# Patient Record
Sex: MALE | Race: WHITE | NOT HISPANIC OR LATINO | Employment: FULL TIME | URBAN - NONMETROPOLITAN AREA
[De-identification: names, ages, dates, MRNs, and addresses within clinical notes are randomized per-mention and may not be internally consistent; named-entity substitution may affect disease eponyms.]

---

## 2020-02-19 ENCOUNTER — TRANSCRIBE ORDERS (OUTPATIENT)
Dept: ADMINISTRATIVE | Facility: HOSPITAL | Age: 40
End: 2020-02-19

## 2020-02-19 ENCOUNTER — HOSPITAL ENCOUNTER (OUTPATIENT)
Dept: ULTRASOUND IMAGING | Facility: HOSPITAL | Age: 40
Discharge: HOME | End: 2020-02-19
Admitting: NURSE PRACTITIONER

## 2020-02-19 ENCOUNTER — HOSPITAL ENCOUNTER (OUTPATIENT)
Dept: ULTRASOUND IMAGING | Facility: HOSPITAL | Age: 40
Discharge: HOME | End: 2020-02-19

## 2020-02-19 DIAGNOSIS — R31.9 HEMATURIA, UNSPECIFIED TYPE: Primary | ICD-10-CM

## 2020-02-19 DIAGNOSIS — R31.9 HEMATURIA, UNSPECIFIED TYPE: ICD-10-CM

## 2020-02-19 PROCEDURE — 76700 US EXAM ABDOM COMPLETE: CPT | Performed by: RADIOLOGY

## 2020-02-19 PROCEDURE — 76857 US EXAM PELVIC LIMITED: CPT | Performed by: RADIOLOGY

## 2020-02-19 PROCEDURE — 76857 US EXAM PELVIC LIMITED: CPT

## 2020-02-19 PROCEDURE — 76700 US EXAM ABDOM COMPLETE: CPT

## 2020-02-21 ENCOUNTER — HOSPITAL ENCOUNTER (EMERGENCY)
Facility: HOSPITAL | Age: 40
Discharge: HOME | End: 2020-02-21
Attending: EMERGENCY MEDICINE | Admitting: EMERGENCY MEDICINE

## 2020-02-21 ENCOUNTER — OFFICE VISIT (OUTPATIENT)
Dept: UROLOGY | Facility: CLINIC | Age: 40
End: 2020-02-21

## 2020-02-21 ENCOUNTER — APPOINTMENT (OUTPATIENT)
Dept: GENERAL RADIOLOGY | Facility: HOSPITAL | Age: 40
End: 2020-02-21

## 2020-02-21 VITALS
TEMPERATURE: 97.6 F | DIASTOLIC BLOOD PRESSURE: 74 MMHG | SYSTOLIC BLOOD PRESSURE: 115 MMHG | RESPIRATION RATE: 18 BRPM | BODY MASS INDEX: 34.86 KG/M2 | OXYGEN SATURATION: 97 % | HEIGHT: 68 IN | WEIGHT: 230 LBS | HEART RATE: 75 BPM

## 2020-02-21 VITALS
BODY MASS INDEX: 34.44 KG/M2 | DIASTOLIC BLOOD PRESSURE: 70 MMHG | HEIGHT: 71 IN | WEIGHT: 246 LBS | SYSTOLIC BLOOD PRESSURE: 118 MMHG

## 2020-02-21 DIAGNOSIS — N20.1 LEFT URETERAL STONE: Primary | ICD-10-CM

## 2020-02-21 LAB
ALBUMIN SERPL-MCNC: 4.05 G/DL (ref 3.5–5.2)
ALBUMIN/GLOB SERPL: 1.5 G/DL
ALP SERPL-CCNC: 101 U/L (ref 39–117)
ALT SERPL W P-5'-P-CCNC: 16 U/L (ref 1–41)
AMYLASE SERPL-CCNC: 37 U/L (ref 28–100)
ANION GAP SERPL CALCULATED.3IONS-SCNC: 13.8 MMOL/L (ref 5–15)
AST SERPL-CCNC: 20 U/L (ref 1–40)
BASOPHILS # BLD AUTO: 0.05 10*3/MM3 (ref 0–0.2)
BASOPHILS NFR BLD AUTO: 0.4 % (ref 0–1.5)
BILIRUB SERPL-MCNC: 0.7 MG/DL (ref 0.2–1.2)
BILIRUB UR QL STRIP: NEGATIVE
BUN BLD-MCNC: 14 MG/DL (ref 6–20)
BUN/CREAT SERPL: 13.3 (ref 7–25)
CALCIUM SPEC-SCNC: 8.8 MG/DL (ref 8.6–10.5)
CHLORIDE SERPL-SCNC: 102 MMOL/L (ref 98–107)
CLARITY UR: CLEAR
CO2 SERPL-SCNC: 21.2 MMOL/L (ref 22–29)
COLOR UR: YELLOW
CREAT BLD-MCNC: 1.05 MG/DL (ref 0.76–1.27)
CRP SERPL-MCNC: 2.64 MG/DL (ref 0–0.5)
DEPRECATED RDW RBC AUTO: 39.8 FL (ref 37–54)
EOSINOPHIL # BLD AUTO: 0.06 10*3/MM3 (ref 0–0.4)
EOSINOPHIL NFR BLD AUTO: 0.4 % (ref 0.3–6.2)
ERYTHROCYTE [DISTWIDTH] IN BLOOD BY AUTOMATED COUNT: 13.7 % (ref 12.3–15.4)
GFR SERPL CREATININE-BSD FRML MDRD: 78 ML/MIN/1.73
GLOBULIN UR ELPH-MCNC: 2.8 GM/DL
GLUCOSE BLD-MCNC: 104 MG/DL (ref 65–99)
GLUCOSE UR STRIP-MCNC: NEGATIVE MG/DL
HCT VFR BLD AUTO: 43.5 % (ref 37.5–51)
HGB BLD-MCNC: 13.8 G/DL (ref 13–17.7)
HGB UR QL STRIP.AUTO: NEGATIVE
HOLD SPECIMEN: NORMAL
HOLD SPECIMEN: NORMAL
IMM GRANULOCYTES # BLD AUTO: 0.05 10*3/MM3 (ref 0–0.05)
IMM GRANULOCYTES NFR BLD AUTO: 0.4 % (ref 0–0.5)
KETONES UR QL STRIP: NEGATIVE
LEUKOCYTE ESTERASE UR QL STRIP.AUTO: NEGATIVE
LIPASE SERPL-CCNC: 23 U/L (ref 13–60)
LYMPHOCYTES # BLD AUTO: 2.53 10*3/MM3 (ref 0.7–3.1)
LYMPHOCYTES NFR BLD AUTO: 18 % (ref 19.6–45.3)
MAGNESIUM SERPL-MCNC: 2.1 MG/DL (ref 1.6–2.6)
MCH RBC QN AUTO: 25.1 PG (ref 26.6–33)
MCHC RBC AUTO-ENTMCNC: 31.7 G/DL (ref 31.5–35.7)
MCV RBC AUTO: 79.2 FL (ref 79–97)
MONOCYTES # BLD AUTO: 1.15 10*3/MM3 (ref 0.1–0.9)
MONOCYTES NFR BLD AUTO: 8.2 % (ref 5–12)
NEUTROPHILS # BLD AUTO: 10.22 10*3/MM3 (ref 1.7–7)
NEUTROPHILS NFR BLD AUTO: 72.6 % (ref 42.7–76)
NITRITE UR QL STRIP: NEGATIVE
NRBC BLD AUTO-RTO: 0 /100 WBC (ref 0–0.2)
PH UR STRIP.AUTO: >=9 [PH] (ref 5–8)
PLATELET # BLD AUTO: 311 10*3/MM3 (ref 140–450)
PMV BLD AUTO: 9.2 FL (ref 6–12)
POTASSIUM BLD-SCNC: 4 MMOL/L (ref 3.5–5.2)
PROT SERPL-MCNC: 6.8 G/DL (ref 6–8.5)
PROT UR QL STRIP: NEGATIVE
RBC # BLD AUTO: 5.49 10*6/MM3 (ref 4.14–5.8)
SODIUM BLD-SCNC: 137 MMOL/L (ref 136–145)
SP GR UR STRIP: 1.02 (ref 1–1.03)
UROBILINOGEN UR QL STRIP: ABNORMAL
WBC NRBC COR # BLD: 14.06 10*3/MM3 (ref 3.4–10.8)
WHOLE BLOOD HOLD SPECIMEN: NORMAL
WHOLE BLOOD HOLD SPECIMEN: NORMAL

## 2020-02-21 PROCEDURE — 80053 COMPREHEN METABOLIC PANEL: CPT | Performed by: PHYSICIAN ASSISTANT

## 2020-02-21 PROCEDURE — 85025 COMPLETE CBC W/AUTO DIFF WBC: CPT | Performed by: PHYSICIAN ASSISTANT

## 2020-02-21 PROCEDURE — 74018 RADEX ABDOMEN 1 VIEW: CPT | Performed by: RADIOLOGY

## 2020-02-21 PROCEDURE — 99284 EMERGENCY DEPT VISIT MOD MDM: CPT

## 2020-02-21 PROCEDURE — 83690 ASSAY OF LIPASE: CPT | Performed by: PHYSICIAN ASSISTANT

## 2020-02-21 PROCEDURE — 25010000002 HYDROMORPHONE 1 MG/ML SOLUTION: Performed by: EMERGENCY MEDICINE

## 2020-02-21 PROCEDURE — 99204 OFFICE O/P NEW MOD 45 MIN: CPT | Performed by: NURSE PRACTITIONER

## 2020-02-21 PROCEDURE — 83735 ASSAY OF MAGNESIUM: CPT | Performed by: PHYSICIAN ASSISTANT

## 2020-02-21 PROCEDURE — 82150 ASSAY OF AMYLASE: CPT | Performed by: PHYSICIAN ASSISTANT

## 2020-02-21 PROCEDURE — 81003 URINALYSIS AUTO W/O SCOPE: CPT | Performed by: PHYSICIAN ASSISTANT

## 2020-02-21 PROCEDURE — 96375 TX/PRO/DX INJ NEW DRUG ADDON: CPT

## 2020-02-21 PROCEDURE — 96374 THER/PROPH/DIAG INJ IV PUSH: CPT

## 2020-02-21 PROCEDURE — 86140 C-REACTIVE PROTEIN: CPT | Performed by: PHYSICIAN ASSISTANT

## 2020-02-21 PROCEDURE — 74018 RADEX ABDOMEN 1 VIEW: CPT

## 2020-02-21 PROCEDURE — 25010000002 KETOROLAC TROMETHAMINE PER 15 MG: Performed by: PHYSICIAN ASSISTANT

## 2020-02-21 RX ORDER — SODIUM CHLORIDE 0.9 % (FLUSH) 0.9 %
10 SYRINGE (ML) INJECTION AS NEEDED
Status: DISCONTINUED | OUTPATIENT
Start: 2020-02-21 | End: 2020-02-21 | Stop reason: HOSPADM

## 2020-02-21 RX ORDER — KETOROLAC TROMETHAMINE 10 MG/1
10 TABLET, FILM COATED ORAL EVERY 6 HOURS PRN
Qty: 16 TABLET | Refills: 0 | Status: SHIPPED | OUTPATIENT
Start: 2020-02-21 | End: 2020-02-25

## 2020-02-21 RX ORDER — KETOROLAC TROMETHAMINE 30 MG/ML
30 INJECTION, SOLUTION INTRAMUSCULAR; INTRAVENOUS ONCE
Status: COMPLETED | OUTPATIENT
Start: 2020-02-21 | End: 2020-02-21

## 2020-02-21 RX ADMIN — HYDROMORPHONE HYDROCHLORIDE 1 MG: 1 INJECTION, SOLUTION INTRAMUSCULAR; INTRAVENOUS; SUBCUTANEOUS at 08:16

## 2020-02-21 RX ADMIN — KETOROLAC TROMETHAMINE 30 MG: 30 INJECTION, SOLUTION INTRAMUSCULAR; INTRAVENOUS at 08:50

## 2020-02-21 RX ADMIN — SODIUM CHLORIDE 1000 ML: 9 INJECTION, SOLUTION INTRAVENOUS at 08:15

## 2020-02-21 NOTE — ED NOTES
Patient states that he had a CT scan done yesterday and it showed he had a kidney stone. Patient states he has a history of kidney stones. Patient states that he has pain medication at home but that his pain became severe this morning. Patient complains of left sided flank pain with bladder pressure. Patient had Zofran ODT prior to arrival. Patient states that his pain radiates down into his left inguinal area.       Cas Rivera RN  02/21/20 [id number]

## 2020-02-21 NOTE — PROGRESS NOTES
Chief Complaint:          Chief Complaint   Patient presents with   • Nephrolithiasis     hospital f/u     Left Ureteral Stone  HPI:   39 y.o. male.    Very Pleasant Patient presents to clinic today with wife.  He reports he has been in the ED all morning secondary to kidney stone pain. Patient reports his discomfort has been ongoing x 3 days.    He states it started with colicky left flank pain that was intermittent, but this morning it became sharp 10/10, constant, with radiation to his left groin area. His pain was aggravated by movement and any slight position changes. It was unrelieved with Norco 5 and Zofran. He had nausea, vomiting that caused him to come to the ED.    Patient went to [facility] ED yesterday for similar symptoms and had a CT scan done showing a Proximal left ureteral 6 mm stone. He had a KUB done prior to this office visit that shows the stone in SITU.    He is in no apparent distress upon exam. He still has left flank pain, pelvic pain/discomfort that is dull in nature. He denies urinary symptoms of frequency, urgency and dysuria. He does not have gross hematuria, denies chills/fevers, N/V/D.    He has a history of kidney stone (four) in the past which he all passed. He is relatively healthy with no significant medical issues.  He would like to try to pass this even though I discussed the fact that he has a 10 to 20% chance.      Past Medical History:        Past Medical History:   Diagnosis Date   • Kidney stones      The following portions of the patient's history were reviewed and updated as appropriate: allergies, current medications, past family history, past medical history, past social history, past surgical history and problem list.    Current Meds:     Current Outpatient Medications   Medication Sig Dispense Refill   • HYDROcodone-acetaminophen (NORCO)  MG per tablet Take 1 tablet by mouth Every 6 (Six) Hours As Needed for Moderate Pain . 10 tablet 0   • ketorolac  (TORADOL) 10 MG tablet Take 1 tablet by mouth Every 6 (Six) Hours As Needed for Moderate Pain . Do not take with any other NSAID PLZ.  May alternate with tylenol 16 tablet 0     No current facility-administered medications for this visit.         Allergies:      Allergies   Allergen Reactions   • Penicillins Hives        Past Surgical History:     No past surgical history on file.      Social History:     Social History     Socioeconomic History   • Marital status:      Spouse name: Not on file   • Number of children: Not on file   • Years of education: Not on file   • Highest education level: Not on file   Tobacco Use   • Smoking status: Never Smoker   • Smokeless tobacco: Never Used   Substance and Sexual Activity   • Alcohol use: Not Currently     Frequency: Never   • Drug use: Never   • Sexual activity: Yes     Partners: Female       Family History:     Family History   Problem Relation Age of Onset   • No Known Problems Father    • No Known Problems Mother        Review of Systems:    Review of Systems   Constitutional: Negative for activity change, appetite change, chills, fatigue and fever.   HENT: Positive for sinus pressure. Negative for congestion.    Eyes: Negative for blurred vision and double vision.   Respiratory: Negative for shortness of breath and wheezing.    Cardiovascular: Negative for chest pain.   Gastrointestinal: Positive for constipation, nausea and vomiting. Negative for abdominal pain and diarrhea.   Genitourinary: Positive for flank pain. Negative for difficulty urinating, discharge, dysuria, frequency, genital sores, hematuria, penile pain, penile swelling, scrotal swelling, testicular pain, urgency and urinary incontinence.   Musculoskeletal: Positive for back pain. Negative for neck pain.   Skin: Negative for color change, dry skin, pallor, rash and skin lesions.   Neurological: Negative for dizziness, weakness, headache and confusion.   Hematological: Does not bruise/bleed  easily.   Psychiatric/Behavioral: Positive for suicidal ideas. Negative for agitation, behavioral problems, decreased concentration, self-injury, sleep disturbance and stress. The patient is not nervous/anxious.         Physical Exam:     Physical Exam   Constitutional: He is oriented to person, place, and time. He appears well-developed and well-nourished. No distress.   HENT:   Head: Normocephalic and atraumatic.   Right Ear: External ear normal.   Left Ear: External ear normal.   Eyes: Pupils are equal, round, and reactive to light. Conjunctivae and EOM are normal. Right eye exhibits no discharge. Left eye exhibits no discharge.   Neck: Normal range of motion. Neck supple. No tracheal deviation present. No thyromegaly present.   Cardiovascular: Normal rate and regular rhythm. Exam reveals no friction rub.   No murmur heard.  Pulmonary/Chest: Effort normal and breath sounds normal. No stridor. No respiratory distress.   Abdominal: Soft. Bowel sounds are normal. He exhibits no distension. There is tenderness. There is no guarding.   Genitourinary: Rectum normal, testes normal and penis normal. Rectal exam shows guaiac negative stool. Uncircumcised. No penile tenderness. No discharge found.   Genitourinary Comments: Pelvic pain/pressure     Musculoskeletal: Normal range of motion. He exhibits tenderness. He exhibits no edema or deformity.   Left Flank pain   Neurological: He is alert and oriented to person, place, and time. No cranial nerve deficit or sensory deficit. He exhibits normal muscle tone. Coordination normal.   Skin: Skin is warm and dry. Capillary refill takes less than 2 seconds. No pallor.   Psychiatric: He has a normal mood and affect. His behavior is normal. Judgment and thought content normal.       Procedure:     No notes on file      Assessment:     Encounter Diagnosis   Name Primary?   • Left ureteral stone Yes     Orders Placed This Encounter   Procedures   • XR Abdomen KUB     Standing Status:    Future     Number of Occurrences:   1     Standing Expiration Date:   2/21/2021     Order Specific Question:   Reason for Exam:     Answer:   left Ureteral stone       Plan:    Nephrolithiasis/left ureteral calculus: Patient presents to clinic with left flank pain that has been ongoing for 2 days.  He has a history of kidney stones, his last one was 15 years ago.    He went to the ED and had a CT scan done which we both reviewed and discussed, showing an approximate 6 mm proximal left ureteral stone with mild hydronephrosis. The right kidney has a 2 cm cyst which we also discussed, otherwise normal.    The Patient has been diagnosed with a ureteral calculus. We have discussed the various parameters regarding spontaneous passage including the notion that a 2-3 mm stone has a high likelihood of spontaneous passage versus a larger stone of 6mm like he has  being caught up in the upper areas of the urinary tract.      We also discussed the medical management of stone disease and the use of medical expulsive therapy in the form of Flomax. This is used in an off label setting. I also talked about nonoperative management including ambulation and increasing fluids and hot tub as being an effective adjuncts in the treatment of a ureteral stone.     We discussed the various therapeutic options available including ESWL, Percutaneous Nephrostolithotomy, Lithotripsy.  We discussed the risks of lithotripsy including the passage of stones the development of a large string of stones in the distal ureter known as Steinstrasse.  In the 3% incidence of that we will need to proceed with a ureteroscopy for obstructing fragments. We also discussed an extremely rare incidence of renal hematoma, and the significance of this.       ESWL-the patient is a candidate for extracorporeal shockwave lithotripsy.  We discussed the type of stone.  And the complications associated with the procedure including but not limited to pain in the flank,  hematoma, spontaneous renal hemorrhage, and adequate fragmentation of stones.  The need for passage of the stones.  The need for concomitant additional procedures in the range of 24% the risk of a distal fragment in the range of 3% requiring ureteroscopic      We discussed the absolute relative indicators for intervention including the presence of sepsis, and pain we cannot control as the primary need for urgent intervention.  We discussed placement of a stent if indicated and the management of the stent as well.     I Discussed this case with Dr Ibrahim, Patient would like to give the stone some time, he thinks he can possibly pass it like he did with his last stone 15 years ago.  He wants to try this weekend.    Encourage significant p.o. fluid intake, gave him a refill of pain medication just in case and Nausea medication and Flomax for medical expulsive therapy.     We will see him back on Monday for follow-up with another KUB, at which point we will discuss surgical options again.  Patient and wife agreeable with plan of care        Patient's Body mass index is 34.31 kg/m². BMI is above normal parameters. Recommendations include: educational material, exercise counseling and nutrition counseling.      Smoking Cessation Counseling:  Never a smoker.  Patient does not currently use any tobacco products.       Counseling was given to patient and family for the following topics diagnostic results including: Proximal left ureteral calculus, instructions for management as follows: Increasing p.o. fluid intake 2 to 3 L daily.  Exercise, walking, pain, nausea control, and prognosis and treatment as follows: Discuss surgical options, ESWL. The interim medical history and current results were reviewed.  A treatment plan with follow-up was made for Left ureteral stone [N20.1].          This document has been electronically signed by Griselda Cheng-Akwa, APRN February 24, 2020 5:38 PM

## 2020-02-21 NOTE — ED PROVIDER NOTES
Subjective   39-year-old male presents to the ED today for left flank pain.  He states this is been going on for the last 3 days and now the pain is worse and more constant.  He does have a past history of kidney stones.  He has never required surgery for them in the past.  He had a CT scan yesterday at [facility] which showed a proximal left ureteral stone measuring approximately 5 or 6 mm.  He states he took a Norco 5 mg around 3 AM and then Zofran prior to arrival.  He states this did not help with his symptoms.  He denies any blood in his urine or difficulty urinating.  He denies any fever.  He states he has had nausea but no vomiting.  He does have an appointment in the urology office today at 2 PM but states due to his pain he was unable to wait.      History provided by:  Patient  Flank Pain   Pain location:  L flank  Pain quality: sharp    Pain radiates to:  Does not radiate  Pain severity:  Severe  Onset quality:  Sudden  Duration:  72 hours  Timing:  Constant  Progression:  Worsening  Chronicity:  Recurrent  Context: not recent travel, not sick contacts, not suspicious food intake and not trauma    Relieved by:  Nothing  Worsened by:  Nothing  Associated symptoms: nausea    Associated symptoms: no anorexia, no chest pain, no chills, no constipation, no cough, no diarrhea, no dysuria, no fatigue, no fever, no hematemesis, no hematochezia, no hematuria, no melena, no shortness of breath and no vomiting        Review of Systems   Constitutional: Negative for chills, fatigue and fever.   HENT: Negative.    Eyes: Negative.    Respiratory: Negative for cough and shortness of breath.    Cardiovascular: Negative for chest pain.   Gastrointestinal: Positive for nausea. Negative for anorexia, constipation, diarrhea, hematemesis, hematochezia, melena and vomiting.   Genitourinary: Positive for flank pain. Negative for difficulty urinating, dysuria and hematuria.   Skin: Negative.     Neurological: Negative.    Psychiatric/Behavioral: Negative.    All other systems reviewed and are negative.      Past Medical History:   Diagnosis Date   • Kidney stones        Allergies   Allergen Reactions   • Penicillins Hives       History reviewed. No pertinent surgical history.    History reviewed. No pertinent family history.    Social History     Socioeconomic History   • Marital status:      Spouse name: Not on file   • Number of children: Not on file   • Years of education: Not on file   • Highest education level: Not on file   Tobacco Use   • Smoking status: Never Smoker   Substance and Sexual Activity   • Drug use: Never   • Sexual activity: Yes     Partners: Female           Objective   Physical Exam   Constitutional: He is oriented to person, place, and time. He appears well-developed and well-nourished. He appears distressed (appears to be in pain).   HENT:   Head: Normocephalic and atraumatic.   Right Ear: External ear normal.   Left Ear: External ear normal.   Nose: Nose normal.   Mouth/Throat: Oropharynx is clear and moist.   Eyes: Pupils are equal, round, and reactive to light. Conjunctivae and EOM are normal.   Neck: Normal range of motion. Neck supple.   Cardiovascular: Normal rate, regular rhythm, normal heart sounds and intact distal pulses.   Pulmonary/Chest: Effort normal and breath sounds normal.   Abdominal: Soft. Bowel sounds are normal. There is tenderness (along the left flank and left CVA area). There is no rebound and no guarding.   Musculoskeletal: Normal range of motion.   Neurological: He is alert and oriented to person, place, and time.   Skin: Skin is warm and dry. Capillary refill takes less than 2 seconds.   Psychiatric: He has a normal mood and affect. His behavior is normal. Judgment and thought content normal.   Nursing note and vitals reviewed.      Procedures           ED Course  ED Course as of Feb 21 0942   Fri Feb 21, 2020   0827 CT of the abdomen and pelvis  without contrast from yesterday reviewed.  Patient had hydroureteronephrosis on the left secondary to a 6 mm proximal left ureteral calculus.    [AH]   0904 Patient reports his pain is much improved.    [AH]   0907 FINDINGS: One view of the abdomen demonstrates large calcification  superior to the left L4 transverse process most likely representing a  large left ureteral stone measuring approximately 5.6 mm.     IMPRESSION:  Appearance compatible with large left ureteral stone    XR Abdomen KUB [AH]   0936 Patient is currently sleeping, no distress.  I have contacted the urology office and they can fit him in at this time to be seen now.  Patient took Flomax last night before bed and already has a prescription for pain, nausea meds at home.  He will go directly to urology office and will return to the ED as needed.    [AH]      ED Course User Index  [AH] Maryam Torres PA                                           [facility]  Number of Diagnoses or Management Options  Left ureteral stone:      Amount and/or Complexity of Data Reviewed  Clinical lab tests: reviewed  Tests in the radiology section of CPT®: reviewed    Patient Progress  Patient progress: improved      Final diagnoses:   Left ureteral stone            Maryam Torres PA  02/21/20 2296

## 2020-02-21 NOTE — ED NOTES
Patient states that he is still in pain at this time and would like to have something else to help it. Provider VIOLET rader made aware.      Cas Rivera RN  02/21/20 0859

## 2020-02-24 ENCOUNTER — OFFICE VISIT (OUTPATIENT)
Dept: UROLOGY | Facility: CLINIC | Age: 40
End: 2020-02-24

## 2020-02-24 ENCOUNTER — HOSPITAL ENCOUNTER (OUTPATIENT)
Dept: GENERAL RADIOLOGY | Facility: HOSPITAL | Age: 40
Discharge: HOME | End: 2020-02-24
Admitting: NURSE PRACTITIONER

## 2020-02-24 VITALS — BODY MASS INDEX: 34.44 KG/M2 | HEIGHT: 71 IN | WEIGHT: 246 LBS

## 2020-02-24 DIAGNOSIS — N20.1 LEFT URETERAL STONE: Primary | ICD-10-CM

## 2020-02-24 DIAGNOSIS — N20.1 LEFT URETERAL STONE: ICD-10-CM

## 2020-02-24 PROBLEM — N20.0 RENAL CALCULUS: Status: ACTIVE | Noted: 2020-02-24

## 2020-02-24 PROCEDURE — 74019 RADEX ABDOMEN 2 VIEWS: CPT | Performed by: RADIOLOGY

## 2020-02-24 PROCEDURE — 99214 OFFICE O/P EST MOD 30 MIN: CPT | Performed by: NURSE PRACTITIONER

## 2020-02-24 PROCEDURE — 74018 RADEX ABDOMEN 1 VIEW: CPT

## 2020-02-24 RX ORDER — HYDROCODONE BITARTRATE AND ACETAMINOPHEN 10; 325 MG/1; MG/1
1 TABLET ORAL EVERY 6 HOURS PRN
Qty: 10 TABLET | Refills: 0 | Status: SHIPPED | OUTPATIENT
Start: 2020-02-24 | End: 2020-02-25

## 2020-02-24 RX ORDER — GENTAMICIN SULFATE 80 MG/100ML
80 INJECTION, SOLUTION INTRAVENOUS ONCE
Status: CANCELLED | OUTPATIENT
Start: 2020-02-24 | End: 2020-02-24

## 2020-02-24 NOTE — PATIENT INSTRUCTIONS
"Dietary Guidelines to Help Prevent Kidney Stones  Kidney stones are deposits of minerals and salts that form inside your kidneys. Your risk of developing kidney stones may be greater depending on your diet, your lifestyle, the medicines you take, and whether you have certain medical conditions. Most people can reduce their chances of developing kidney stones by following the instructions below. Depending on your overall health and the type of kidney stones you tend to develop, your dietitian may give you more specific instructions.  What are tips for following this plan?  Reading food labels  · Choose foods with \"no salt added\" or \"low-salt\" labels. Limit your sodium intake to less than 1500 mg per day.  · Choose foods with calcium for each meal and snack. Try to eat about 300 mg of calcium at each meal. Foods that contain 200-500 mg of calcium per serving include:  ? 8 oz (237 ml) of milk, fortified nondairy milk, and fortified fruit juice.  ? 8 oz (237 ml) of kefir, yogurt, and soy yogurt.  ? 4 oz (118 ml) of tofu.  ? 1 oz of cheese.  ? 1 cup (300 g) of dried figs.  ? 1 cup (91 g) of cooked broccoli.  ? 1-3 oz can of sardines or mackerel.  · Most people need 1000 to 1500 mg of calcium each day. Talk to your dietitian about how much calcium is recommended for you.  Shopping  · Buy plenty of fresh fruits and vegetables. Most people do not need to avoid fruits and vegetables, even if they contain nutrients that may contribute to kidney stones.  · When shopping for convenience foods, choose:  ? Whole pieces of fruit.  ? Premade salads with dressing on the side.  ? Low-fat fruit and yogurt smoothies.  · Avoid buying frozen meals or prepared deli foods.  · Look for foods with live cultures, such as yogurt and kefir.  Cooking  · Do not add salt to food when cooking. Place a salt shaker on the table and allow each person to add his or her own salt to taste.  · Use vegetable protein, such as beans, textured vegetable " protein (TVP), or tofu instead of meat in pasta, casseroles, and soups.  Meal planning    · Eat less salt, if told by your dietitian. To do this:  ? Avoid eating processed or premade food.  ? Avoid eating fast food.  · Eat less animal protein, including cheese, meat, poultry, or fish, if told by your dietitian. To do this:  ? Limit the number of times you have meat, poultry, fish, or cheese each week. Eat a diet free of meat at least 2 days a week.  ? Eat only one serving each day of meat, poultry, fish, or seafood.  ? When you prepare animal protein, cut pieces into small portion sizes. For most meat and fish, one serving is about the size of one deck of cards.  · Eat at least 5 servings of fresh fruits and vegetables each day. To do this:  ? Keep fruits and vegetables on hand for snacks.  ? Eat 1 piece of fruit or a handful of berries with breakfast.  ? Have a salad and fruit at lunch.  ? Have two kinds of vegetables at dinner.  · Limit foods that are high in a substance called oxalate. These include:  ? Spinach.  ? Rhubarb.  ? Beets.  ? Potato chips and french fries.  ? Nuts.  · If you regularly take a diuretic medicine, make sure to eat at least 1-2 fruits or vegetables high in potassium each day. These include:  ? Avocado.  ? Banana.  ? Orange, prune, carrot, or tomato juice.  ? Baked potato.  ? Cabbage.  ? Beans and split peas.  General instructions    · Drink enough fluid to keep your urine clear or pale yellow. This is the most important thing you can do.  · Talk to your health care provider and dietitian about taking daily supplements. Depending on your health and the cause of your kidney stones, you may be advised:  ? Not to take supplements with vitamin C.  ? To take a calcium supplement.  ? To take a daily probiotic supplement.  ? To take other supplements such as magnesium, fish oil, or vitamin B6.  · Take all medicines and supplements as told by your health care provider.  · Limit alcohol intake to no  more than 1 drink a day for nonpregnant women and 2 drinks a day for men. One drink equals 12 oz of beer, 5 oz of wine, or 1½ oz of hard liquor.  · Lose weight if told by your health care provider. Work with your dietitian to find strategies and an eating plan that works best for you.  What foods are not recommended?  Limit your intake of the following foods, or as told by your dietitian. Talk to your dietitian about specific foods you should avoid based on the type of kidney stones and your overall health.  Grains  Breads. Bagels. Rolls. Baked goods. Salted crackers. Cereal. Pasta.  Vegetables  Spinach. Rhubarb. Beets. Canned vegetables. Pickles. Olives.  Meats and other protein foods  Nuts. Nut butters. Large portions of meat, poultry, or fish. Salted or cured meats. Deli meats. Hot dogs. Sausages.  Dairy  Cheese.  Beverages  Regular soft drinks. Regular vegetable juice.  Seasonings and other foods  Seasoning blends with salt. Salad dressings. Canned soups. Soy sauce. Ketchup. Barbecue sauce. Canned pasta sauce. Casseroles. Pizza. Lasagna. Frozen meals. Potato chips. French fries.  Summary  · You can reduce your risk of kidney stones by making changes to your diet.  · The most important thing you can do is drink enough fluid. You should drink enough fluid to keep your urine clear or pale yellow.  · Ask your health care provider or dietitian how much protein from animal sources you should eat each day, and also how much salt and calcium you should have each day.  This information is not intended to replace advice given to you by your health care provider. Make sure you discuss any questions you have with your health care provider.  Document Released: 04/13/2012 Document Revised: 11/28/2017 Document Reviewed: 11/28/2017  [employer] Patient Education © 2020 [employer]

## 2020-02-24 NOTE — PROGRESS NOTES
Chief Complaint:          Chief Complaint   Patient presents with   • Nephrolithiasis     f/u KUB prior       HPI:   39 y.o. male.  Patient returns to clinic today for follow-up.  He had a repeat KUB done today that show large calcification just superior  to the left L3 transverse process, most likely representing left-sided 6 mm  ureteral stone.  He is desirous of surgical intervention.  He states he is unable to take the pain/discomfort anymore.    He describes ongoing left flank pain that has been very colicky, occasionally sharp causing him significant nausea and episodes of vomiting.  Has increasing pelvic pain and pressure, with urinary symptoms that are very bothersome to him.    He denies dysuria, gross hematuria, chills or fevers.  He has constant left flank pain, unrelieved with his recent pain medications.      Past Medical History:        Past Medical History:   Diagnosis Date   • Kidney stones          The following portions of the patient's history were reviewed and updated as appropriate: allergies, current medications, past family history, past medical history, past social history, past surgical history and problem list.    Current Meds:     Current Outpatient Medications   Medication Sig Dispense Refill   • ciprofloxacin (CIPRO) 500 MG tablet Take 500 mg by mouth 2 (Two) Times a Day.     • HYDROcodone-acetaminophen (NORCO)  MG per tablet Take 1 tablet by mouth Every 6 (Six) Hours As Needed for Moderate Pain .     • ketorolac (TORADOL) 10 MG tablet Take 1 tablet by mouth Every 6 (Six) Hours As Needed for Moderate Pain . 16 tablet 2   • oxyCODONE-acetaminophen (PERCOCET)  MG per tablet Take 1 tablet by mouth Every 6 (Six) Hours As Needed for Moderate Pain . 8 tablet 0   • tamsulosin (FLOMAX) 0.4 MG capsule 24 hr capsule Take 1 capsule by mouth Daily.       No current facility-administered medications for this visit.         Allergies:      Allergies   Allergen Reactions   • Penicillins  Hives        Past Surgical History:     Past Surgical History:   Procedure Laterality Date   • TONSILLECTOMY           Social History:     Social History     Socioeconomic History   • Marital status:      Spouse name: Not on file   • Number of children: Not on file   • Years of education: Not on file   • Highest education level: Not on file   Tobacco Use   • Smoking status: Never Smoker   • Smokeless tobacco: Never Used   Substance and Sexual Activity   • Alcohol use: Not Currently     Frequency: Never   • Drug use: Never   • Sexual activity: Yes     Partners: Female       Family History:     Family History   Problem Relation Age of Onset   • No Known Problems Father    • No Known Problems Mother        Review of Systems:     Review of Systems   Constitutional: Positive for activity change, appetite change, chills and fatigue. Negative for fever.   HENT: Negative for congestion and sinus pressure.    Eyes: Negative for blurred vision and double vision.   Respiratory: Negative for shortness of breath and wheezing.    Gastrointestinal: Positive for constipation, nausea and vomiting. Negative for abdominal pain and diarrhea.   Genitourinary: Positive for dysuria, flank pain, frequency, nocturia and urgency. Negative for difficulty urinating, discharge, genital sores, hematuria, penile pain, penile swelling, scrotal swelling, testicular pain and urinary incontinence.   Musculoskeletal: Positive for back pain.   Neurological: Negative for dizziness, weakness and confusion.   Psychiatric/Behavioral: Positive for stress. Negative for agitation, behavioral problems and decreased concentration. The patient is nervous/anxious.         Physical Exam:     Physical Exam   Constitutional: He is oriented to person, place, and time. He appears well-developed and well-nourished. No distress.   HENT:   Head: Normocephalic and atraumatic.   Right Ear: External ear normal.   Left Ear: External ear normal.   Eyes: Pupils are  equal, round, and reactive to light. Conjunctivae and EOM are normal. Right eye exhibits no discharge. Left eye exhibits no discharge.   Neck: Normal range of motion. Neck supple. No tracheal deviation present. No thyromegaly present.   Cardiovascular: Normal rate and regular rhythm. Exam reveals no friction rub.   No murmur heard.  Pulmonary/Chest: Effort normal and breath sounds normal. No stridor. No respiratory distress.   Abdominal: Soft. Bowel sounds are normal. He exhibits no distension. There is tenderness. There is no guarding.   Genitourinary: Rectum normal, testes normal and penis normal. Rectal exam shows guaiac negative stool. Uncircumcised. No penile tenderness. No discharge found.   Musculoskeletal: Normal range of motion. He exhibits tenderness. He exhibits no edema or deformity.   Left flank pain   Neurological: He is alert and oriented to person, place, and time. No cranial nerve deficit or sensory deficit. Coordination normal.   Skin: Skin is warm and dry. Capillary refill takes less than 2 seconds. No pallor.   Psychiatric: He has a normal mood and affect. His behavior is normal. Judgment and thought content normal.       Procedure:       Assessment/Plan:     Left Ureteral calculus: Patient returns to clinic this morning for follow-up.  He states he was unable to pass his stone and now desirous of surgical intervention.    He had a repeat KUB this morning which we both reviewed and discussed, showing an approximate 6 mm proximal left ureteral stone still in situ, with mild hydronephrosis. The right kidney has a 2 cm cyst which we also discussed, otherwise normal.     The Patient has been diagnosed with a ureteral calculus. We have discussed the various parameters regarding spontaneous passage including the notion that a 2-3 mm stone has a high likelihood of spontaneous passage versus a larger stone of 6mm like he has  being caught up in the upper areas of the urinary tract.       We also  discussed the medical management of stone disease and the use of medical expulsive therapy in the form of Flomax. This is used in an off label setting. I also talked about nonoperative management including ambulation and increasing fluids and hot tub as being an effective adjuncts in the treatment of a ureteral stone.      Again, we discussed the various therapeutic options available including ESWL, Percutaneous Nephrostolithotomy, Lithotripsy.  We discussed the risks of lithotripsy including the passage of stones the development of a large string of stones in the distal ureter known as Steinstrasse.  In the 3% incidence of that we will need to proceed with a ureteroscopy for obstructing fragments. We also discussed an extremely rare incidence of renal hematoma, and the significance of this.      ESWL-the patient is a candidate for extracorporeal shockwave lithotripsy.  We discussed the type of stone.  And the complications associated with the procedure including but not limited to pain in the flank, hematoma, spontaneous renal hemorrhage, and adequate fragmentation of stones.  The need for passage of the stones.  The need for concomitant additional procedures in the range of 24% the risk of a distal fragment in the range of 3% requiring ureteroscopic       We discussed the absolute relative indicators for intervention including the presence of sepsis, and pain we cannot control as the primary need for urgent intervention.  We discussed placement of a stent if indicated and the management of the stent as well.      I Discussed this case with Dr Ibrahim, who is also  very familiar with this patient and has  refilled his pain medications.    Patient has been scheduled for left extracorporal shockwave lithotripsy- left ESWL with Dr. Whaley on Friday, February 28, 2020      Patient reports that he is not currently experiencing any symptoms of urinary incontinence.      Patient's Body mass index is 34.31 kg/m². BMI is  above normal parameters. Recommendations include: educational material, exercise counseling and nutrition counseling.    Smoking Cessation Counseling:  Never a smoker.  Patient does not currently use any tobacco products.     Counseling was given to patient for the following topics diagnostic results including: Left 6 mm ureteral stone, instructions for management as follows: Increasing p.o. fluid intake, avoiding constipation, walking, hot tubs, pain control, nausea control, Flomax and impressions as follows: Scheduled for left ESWL on February 28, 2020. The interim medical history and current results were reviewed.  A treatment plan with follow-up was made for Left ureteral stone [N20.1].          This document has been electronically signed by Griselda Cheng-Akwa, APRN February 26, 2020 8:13 PM

## 2020-02-25 ENCOUNTER — OFFICE VISIT (OUTPATIENT)
Dept: UROLOGY | Facility: CLINIC | Age: 40
End: 2020-02-25

## 2020-02-25 VITALS — HEIGHT: 71 IN | BODY MASS INDEX: 34.44 KG/M2 | WEIGHT: 246 LBS

## 2020-02-25 DIAGNOSIS — N20.1 LEFT URETERAL STONE: Primary | ICD-10-CM

## 2020-02-25 PROCEDURE — 99213 OFFICE O/P EST LOW 20 MIN: CPT | Performed by: UROLOGY

## 2020-02-25 RX ORDER — KETOROLAC TROMETHAMINE 10 MG/1
10 TABLET, FILM COATED ORAL EVERY 6 HOURS PRN
Qty: 16 TABLET | Refills: 2 | Status: SHIPPED | OUTPATIENT
Start: 2020-02-25

## 2020-02-25 RX ORDER — GENTAMICIN SULFATE 80 MG/100ML
80 INJECTION, SOLUTION INTRAVENOUS ONCE
Status: CANCELLED | OUTPATIENT
Start: 2020-02-25 | End: 2020-02-25

## 2020-02-25 RX ORDER — OXYCODONE AND ACETAMINOPHEN 10; 325 MG/1; MG/1
1 TABLET ORAL EVERY 6 HOURS PRN
Qty: 8 TABLET | Refills: 0 | Status: SHIPPED | OUTPATIENT
Start: 2020-02-25

## 2020-02-25 NOTE — PROGRESS NOTES
Chief Complaint:          Chief Complaint   Patient presents with   • Left Ureteral Stone       HPI:   39 y.o. male with a left stone who is desirous of intervention.  After discussion I am going to set him up for intervention as soon as possible.  This is a left ureteral stone.  Ureteral calculus-patient has been diagnosed with a ureteral calculus.  We have discussed the various parameters regarding spontaneous passage including the notion that a 2 mm stone has a high likelihood of spontaneous passage versus a larger stone being caught up in the upper areas of the urinary tract.  We also discussed the medical management of stone disease and the use of medical expulsive therapy in the form of Flomax.  This is used in an off label setting.  We discussed the indicators for intervention including  absolute indicators such as sepsis and uncontrollable severe pain as well as  the relative indicators of moderate pain that is well-controlled with various analgesia.  I also talked about nonoperative management including ambulation and increasing fluids and hot tub as being an effective adjuncts in the treatment of a ureteral stone.      Past Medical History:        Past Medical History:   Diagnosis Date   • Kidney stones          Current Meds:     Current Outpatient Medications   Medication Sig Dispense Refill   • HYDROcodone-acetaminophen (NORCO)  MG per tablet Take 1 tablet by mouth Every 6 (Six) Hours As Needed for Moderate Pain . 10 tablet 0   • ketorolac (TORADOL) 10 MG tablet Take 1 tablet by mouth Every 6 (Six) Hours As Needed for Moderate Pain . Do not take with any other NSAID PLZ.  May alternate with tylenol 16 tablet 0     No current facility-administered medications for this visit.         Allergies:      Allergies   Allergen Reactions   • Penicillins Hives        Past Surgical History:     No past surgical history on file.      Social History:     Social History     Socioeconomic History   • Marital  status:      Spouse name: Not on file   • Number of children: Not on file   • Years of education: Not on file   • Highest education level: Not on file   Tobacco Use   • Smoking status: Never Smoker   • Smokeless tobacco: Never Used   Substance and Sexual Activity   • Alcohol use: Not Currently     Frequency: Never   • Drug use: Never   • Sexual activity: Yes     Partners: Female       Family History:     Family History   Problem Relation Age of Onset   • No Known Problems Father    • No Known Problems Mother        Review of Systems:     Review of Systems   Constitutional: Negative.    HENT: Negative.    Eyes: Negative.    Respiratory: Negative.    Cardiovascular: Negative.    Gastrointestinal: Negative.    Endocrine: Negative.    Musculoskeletal: Negative.    Allergic/Immunologic: Negative.    Neurological: Negative.    Hematological: Negative.    Psychiatric/Behavioral: Negative.        Physical Exam:     Physical Exam   Constitutional: He is oriented to person, place, and time. He appears well-developed and well-nourished.   HENT:   Head: Normocephalic and atraumatic.   Eyes: Pupils are equal, round, and reactive to light. Conjunctivae and EOM are normal.   Neck: Normal range of motion.   Cardiovascular: Normal rate, regular rhythm, normal heart sounds and intact distal pulses.   Pulmonary/Chest: Effort normal and breath sounds normal.   Abdominal: Soft. Bowel sounds are normal.   Musculoskeletal: Normal range of motion.   Neurological: He is alert and oriented to person, place, and time. He has normal reflexes.   Skin: Skin is warm and dry.   Psychiatric: He has a normal mood and affect. His behavior is normal. Judgment and thought content normal.   Nursing note and vitals reviewed.      I have reviewed the following portions of the patient's history: allergies, current medications, past family history, past medical history, past social history, past surgical history, problem list and ROS and confirm it's  accurate.      Procedure:       Assessment/Plan:   Ureteral calculus-patient has been diagnosed with a ureteral calculus.  We have discussed the various parameters regarding spontaneous passage including the notion that a 2 mm stone has a high likelihood of spontaneous passage versus a larger stone being caught up in the upper areas of the urinary tract.  We also discussed the medical management of stone disease and the use of medical expulsive therapy in the form of Flomax.  This is used in an off label setting.  We discussed the indicators for intervention including  absolute indicators such as sepsis and uncontrollable severe pain as well as  the relative indicators of moderate pain that is well-controlled with various analgesia.  I also talked about nonoperative management including ambulation and increasing fluids and hot tub as being an effective adjuncts in the treatment of a ureteral stone.            Patient's Body mass index is 34.33 kg/m². BMI is above normal parameters. Recommendations include: educational material.              This document has been electronically signed by NAHOMY OLIVEIRA MD February 25, 2020 8:38 AM

## 2020-02-26 ENCOUNTER — APPOINTMENT (OUTPATIENT)
Dept: GENERAL RADIOLOGY | Facility: HOSPITAL | Age: 40
End: 2020-02-26

## 2020-02-26 ENCOUNTER — HOSPITAL ENCOUNTER (OUTPATIENT)
Facility: HOSPITAL | Age: 40
Discharge: HOME | End: 2020-02-26
Attending: UROLOGY | Admitting: UROLOGY

## 2020-02-26 ENCOUNTER — ANESTHESIA (OUTPATIENT)
Dept: PERIOP | Facility: HOSPITAL | Age: 40
End: 2020-02-26

## 2020-02-26 ENCOUNTER — ANESTHESIA EVENT (OUTPATIENT)
Dept: PERIOP | Facility: HOSPITAL | Age: 40
End: 2020-02-26

## 2020-02-26 VITALS
RESPIRATION RATE: 16 BRPM | TEMPERATURE: 97.5 F | DIASTOLIC BLOOD PRESSURE: 75 MMHG | HEIGHT: 68 IN | HEART RATE: 85 BPM | OXYGEN SATURATION: 98 % | BODY MASS INDEX: 36.37 KG/M2 | SYSTOLIC BLOOD PRESSURE: 120 MMHG | WEIGHT: 240 LBS

## 2020-02-26 DIAGNOSIS — N20.1 LEFT URETERAL STONE: ICD-10-CM

## 2020-02-26 PROCEDURE — 76000 FLUOROSCOPY <1 HR PHYS/QHP: CPT | Performed by: RADIOLOGY

## 2020-02-26 PROCEDURE — 25010000002 PROPOFOL 10 MG/ML EMULSION: Performed by: NURSE ANESTHETIST, CERTIFIED REGISTERED

## 2020-02-26 PROCEDURE — 25010000002 MIDAZOLAM PER 1 MG: Performed by: NURSE ANESTHETIST, CERTIFIED REGISTERED

## 2020-02-26 PROCEDURE — C2617 STENT, NON-COR, TEM W/O DEL: HCPCS | Performed by: UROLOGY

## 2020-02-26 PROCEDURE — 25010000002 FENTANYL CITRATE (PF) 100 MCG/2ML SOLUTION: Performed by: NURSE ANESTHETIST, CERTIFIED REGISTERED

## 2020-02-26 PROCEDURE — 52356 CYSTO/URETERO W/LITHOTRIPSY: CPT | Performed by: UROLOGY

## 2020-02-26 PROCEDURE — 74018 RADEX ABDOMEN 1 VIEW: CPT

## 2020-02-26 PROCEDURE — 74018 RADEX ABDOMEN 1 VIEW: CPT | Performed by: RADIOLOGY

## 2020-02-26 PROCEDURE — 76000 FLUOROSCOPY <1 HR PHYS/QHP: CPT

## 2020-02-26 PROCEDURE — 25010000002 GENTAMICIN PER 80 MG: Performed by: UROLOGY

## 2020-02-26 PROCEDURE — 25010000002 ONDANSETRON PER 1 MG: Performed by: NURSE ANESTHETIST, CERTIFIED REGISTERED

## 2020-02-26 PROCEDURE — C1769 GUIDE WIRE: HCPCS | Performed by: UROLOGY

## 2020-02-26 DEVICE — URETERAL STENT
Type: IMPLANTABLE DEVICE | Site: URETER | Status: FUNCTIONAL
Brand: POLARIS™ ULTRA

## 2020-02-26 RX ORDER — LIDOCAINE HYDROCHLORIDE 20 MG/ML
INJECTION, SOLUTION INFILTRATION; PERINEURAL AS NEEDED
Status: DISCONTINUED | OUTPATIENT
Start: 2020-02-26 | End: 2020-02-26 | Stop reason: SURG

## 2020-02-26 RX ORDER — MAGNESIUM HYDROXIDE 1200 MG/15ML
LIQUID ORAL AS NEEDED
Status: DISCONTINUED | OUTPATIENT
Start: 2020-02-26 | End: 2020-02-26 | Stop reason: HOSPADM

## 2020-02-26 RX ORDER — IPRATROPIUM BROMIDE AND ALBUTEROL SULFATE 2.5; .5 MG/3ML; MG/3ML
3 SOLUTION RESPIRATORY (INHALATION) ONCE AS NEEDED
Status: DISCONTINUED | OUTPATIENT
Start: 2020-02-26 | End: 2020-02-26 | Stop reason: HOSPADM

## 2020-02-26 RX ORDER — SODIUM CHLORIDE, SODIUM LACTATE, POTASSIUM CHLORIDE, CALCIUM CHLORIDE 600; 310; 30; 20 MG/100ML; MG/100ML; MG/100ML; MG/100ML
125 INJECTION, SOLUTION INTRAVENOUS CONTINUOUS
Status: DISCONTINUED | OUTPATIENT
Start: 2020-02-26 | End: 2020-02-26 | Stop reason: HOSPADM

## 2020-02-26 RX ORDER — SODIUM CHLORIDE 0.9 % (FLUSH) 0.9 %
10 SYRINGE (ML) INJECTION EVERY 12 HOURS SCHEDULED
Status: DISCONTINUED | OUTPATIENT
Start: 2020-02-26 | End: 2020-02-26 | Stop reason: HOSPADM

## 2020-02-26 RX ORDER — GENTAMICIN SULFATE 80 MG/100ML
80 INJECTION, SOLUTION INTRAVENOUS ONCE
Status: COMPLETED | OUTPATIENT
Start: 2020-02-26 | End: 2020-02-26

## 2020-02-26 RX ORDER — MEPERIDINE HYDROCHLORIDE 25 MG/ML
12.5 INJECTION INTRAMUSCULAR; INTRAVENOUS; SUBCUTANEOUS
Status: DISCONTINUED | OUTPATIENT
Start: 2020-02-26 | End: 2020-02-26 | Stop reason: HOSPADM

## 2020-02-26 RX ORDER — HYDROCODONE BITARTRATE AND ACETAMINOPHEN 10; 325 MG/1; MG/1
1 TABLET ORAL EVERY 6 HOURS PRN
COMMUNITY
End: 2020-02-28 | Stop reason: SDUPTHER

## 2020-02-26 RX ORDER — MIDAZOLAM HYDROCHLORIDE 1 MG/ML
INJECTION INTRAMUSCULAR; INTRAVENOUS AS NEEDED
Status: DISCONTINUED | OUTPATIENT
Start: 2020-02-26 | End: 2020-02-26 | Stop reason: SURG

## 2020-02-26 RX ORDER — OXYCODONE HYDROCHLORIDE AND ACETAMINOPHEN 5; 325 MG/1; MG/1
1 TABLET ORAL ONCE AS NEEDED
Status: DISCONTINUED | OUTPATIENT
Start: 2020-02-26 | End: 2020-02-26 | Stop reason: HOSPADM

## 2020-02-26 RX ORDER — FENTANYL CITRATE 50 UG/ML
INJECTION, SOLUTION INTRAMUSCULAR; INTRAVENOUS AS NEEDED
Status: DISCONTINUED | OUTPATIENT
Start: 2020-02-26 | End: 2020-02-26 | Stop reason: SURG

## 2020-02-26 RX ORDER — PROPOFOL 10 MG/ML
VIAL (ML) INTRAVENOUS AS NEEDED
Status: DISCONTINUED | OUTPATIENT
Start: 2020-02-26 | End: 2020-02-26 | Stop reason: SURG

## 2020-02-26 RX ORDER — ONDANSETRON 2 MG/ML
4 INJECTION INTRAMUSCULAR; INTRAVENOUS AS NEEDED
Status: DISCONTINUED | OUTPATIENT
Start: 2020-02-26 | End: 2020-02-26 | Stop reason: HOSPADM

## 2020-02-26 RX ORDER — FENTANYL CITRATE 50 UG/ML
50 INJECTION, SOLUTION INTRAMUSCULAR; INTRAVENOUS
Status: DISCONTINUED | OUTPATIENT
Start: 2020-02-26 | End: 2020-02-26 | Stop reason: HOSPADM

## 2020-02-26 RX ORDER — ATROPA BELLADONNA AND OPIUM 16.2; 3 MG/1; MG/1
SUPPOSITORY RECTAL AS NEEDED
Status: DISCONTINUED | OUTPATIENT
Start: 2020-02-26 | End: 2020-02-26 | Stop reason: HOSPADM

## 2020-02-26 RX ORDER — SODIUM CHLORIDE 0.9 % (FLUSH) 0.9 %
10 SYRINGE (ML) INJECTION AS NEEDED
Status: DISCONTINUED | OUTPATIENT
Start: 2020-02-26 | End: 2020-02-26 | Stop reason: HOSPADM

## 2020-02-26 RX ORDER — FAMOTIDINE 10 MG/ML
INJECTION, SOLUTION INTRAVENOUS AS NEEDED
Status: DISCONTINUED | OUTPATIENT
Start: 2020-02-26 | End: 2020-02-26 | Stop reason: SURG

## 2020-02-26 RX ORDER — ONDANSETRON 2 MG/ML
INJECTION INTRAMUSCULAR; INTRAVENOUS AS NEEDED
Status: DISCONTINUED | OUTPATIENT
Start: 2020-02-26 | End: 2020-02-26 | Stop reason: SURG

## 2020-02-26 RX ADMIN — FENTANYL CITRATE 50 MCG: 50 INJECTION INTRAMUSCULAR; INTRAVENOUS at 11:32

## 2020-02-26 RX ADMIN — SODIUM CHLORIDE, POTASSIUM CHLORIDE, SODIUM LACTATE AND CALCIUM CHLORIDE 125 ML/HR: 600; 310; 30; 20 INJECTION, SOLUTION INTRAVENOUS at 11:21

## 2020-02-26 RX ADMIN — FAMOTIDINE 20 MG: 10 INJECTION INTRAVENOUS at 11:31

## 2020-02-26 RX ADMIN — LIDOCAINE HYDROCHLORIDE 60 MG: 20 INJECTION, SOLUTION INFILTRATION; PERINEURAL at 11:32

## 2020-02-26 RX ADMIN — MIDAZOLAM HYDROCHLORIDE 2 MG: 1 INJECTION, SOLUTION INTRAMUSCULAR; INTRAVENOUS at 11:31

## 2020-02-26 RX ADMIN — GENTAMICIN SULFATE 80 MG: 80 INJECTION, SOLUTION INTRAVENOUS at 11:32

## 2020-02-26 RX ADMIN — PROPOFOL 150 MG: 10 INJECTION, EMULSION INTRAVENOUS at 11:32

## 2020-02-26 RX ADMIN — ONDANSETRON 4 MG: 2 INJECTION INTRAMUSCULAR; INTRAVENOUS at 11:31

## 2020-02-26 RX ADMIN — FENTANYL CITRATE 50 MCG: 50 INJECTION INTRAMUSCULAR; INTRAVENOUS at 11:31

## 2020-02-26 NOTE — ANESTHESIA PREPROCEDURE EVALUATION
Anesthesia Evaluation     Patient summary reviewed and Nursing notes reviewed   no history of anesthetic complications:  NPO Solid Status: > 8 hours  NPO Liquid Status: > 8 hours           Airway   Mallampati: II  TM distance: >3 FB  Neck ROM: full  no difficulty expected  Dental - normal exam     Pulmonary - normal exam   (+) asthma,  (-) not a smoker  Cardiovascular - negative cardio ROS and normal exam  Exercise tolerance: good (4-7 METS)    NYHA Classification: II        Neuro/Psych- negative ROS  GI/Hepatic/Renal/Endo    (+) obesity,  GERD,  renal disease,     Musculoskeletal     (+) back pain,   Abdominal  - normal exam    Bowel sounds: normal.   Substance History - negative use     OB/GYN negative ob/gyn ROS         Other - negative ROS                     Anesthesia Plan    ASA 2     general     intravenous induction     Anesthetic plan, all risks, benefits, and alternatives have been provided, discussed and informed consent has been obtained with: patient.  Use of blood products discussed with patient  Consented to blood products.

## 2020-02-26 NOTE — ANESTHESIA PROCEDURE NOTES
Airway  Date/Time: 2/26/2020 11:32 AM    General Information and Staff    Patient location during procedure: OR    Indications and Patient Condition    Preoxygenated: yes  Mask difficulty assessment: 0 - not attempted    Final Airway Details  Final airway type: supraglottic airway      Successful airway: unique  Size 4    Number of attempts at approach: 1  Assessment: lips, teeth, and gum same as pre-op

## 2020-02-26 NOTE — ANESTHESIA POSTPROCEDURE EVALUATION
Patient: Dakotah Chavez    Procedure Summary     Date:  02/26/20 Room / Location:  [facility] OR 06 /  [facility] OR    Anesthesia Start:  1131 Anesthesia Stop:  1205    Procedure:  URETEROSCOPY LASER LITHOTRIPSY (Left ) Diagnosis:       Left ureteral stone      (Left ureteral stone [N20.1])    Surgeon:  Orestes Ibrahim MD Provider:  Juan Antonio Oleary MD    Anesthesia Type:  general ASA Status:  2          Anesthesia Type: general    Vitals  Vitals Value Taken Time   /79 2/26/2020 12:33 PM   Temp 97.4 °F (36.3 °C) 2/26/2020 12:05 PM   Pulse 83 2/26/2020 12:33 PM   Resp 16 2/26/2020 12:33 PM   SpO2 100 % 2/26/2020 12:33 PM           Post Anesthesia Care and Evaluation    Patient location during evaluation: PHASE II  Patient participation: complete - patient participated  Level of consciousness: awake and alert  Pain score: 1  Pain management: adequate  Airway patency: patent  Anesthetic complications: No anesthetic complications  PONV Status: controlled  Cardiovascular status: acceptable  Respiratory status: acceptable  Hydration status: acceptable

## 2020-02-27 NOTE — PATIENT INSTRUCTIONS
"Dietary Guidelines to Help Prevent Kidney Stones  Kidney stones are deposits of minerals and salts that form inside your kidneys. Your risk of developing kidney stones may be greater depending on your diet, your lifestyle, the medicines you take, and whether you have certain medical conditions. Most people can reduce their chances of developing kidney stones by following the instructions below. Depending on your overall health and the type of kidney stones you tend to develop, your dietitian may give you more specific instructions.  What are tips for following this plan?  Reading food labels  · Choose foods with \"no salt added\" or \"low-salt\" labels. Limit your sodium intake to less than 1500 mg per day.  · Choose foods with calcium for each meal and snack. Try to eat about 300 mg of calcium at each meal. Foods that contain 200-500 mg of calcium per serving include:  ? 8 oz (237 ml) of milk, fortified nondairy milk, and fortified fruit juice.  ? 8 oz (237 ml) of kefir, yogurt, and soy yogurt.  ? 4 oz (118 ml) of tofu.  ? 1 oz of cheese.  ? 1 cup (300 g) of dried figs.  ? 1 cup (91 g) of cooked broccoli.  ? 1-3 oz can of sardines or mackerel.  · Most people need 1000 to 1500 mg of calcium each day. Talk to your dietitian about how much calcium is recommended for you.  Shopping  · Buy plenty of fresh fruits and vegetables. Most people do not need to avoid fruits and vegetables, even if they contain nutrients that may contribute to kidney stones.  · When shopping for convenience foods, choose:  ? Whole pieces of fruit.  ? Premade salads with dressing on the side.  ? Low-fat fruit and yogurt smoothies.  · Avoid buying frozen meals or prepared deli foods.  · Look for foods with live cultures, such as yogurt and kefir.  Cooking  · Do not add salt to food when cooking. Place a salt shaker on the table and allow each person to add his or her own salt to taste.  · Use vegetable protein, such as beans, textured vegetable " protein (TVP), or tofu instead of meat in pasta, casseroles, and soups.  Meal planning    · Eat less salt, if told by your dietitian. To do this:  ? Avoid eating processed or premade food.  ? Avoid eating fast food.  · Eat less animal protein, including cheese, meat, poultry, or fish, if told by your dietitian. To do this:  ? Limit the number of times you have meat, poultry, fish, or cheese each week. Eat a diet free of meat at least 2 days a week.  ? Eat only one serving each day of meat, poultry, fish, or seafood.  ? When you prepare animal protein, cut pieces into small portion sizes. For most meat and fish, one serving is about the size of one deck of cards.  · Eat at least 5 servings of fresh fruits and vegetables each day. To do this:  ? Keep fruits and vegetables on hand for snacks.  ? Eat 1 piece of fruit or a handful of berries with breakfast.  ? Have a salad and fruit at lunch.  ? Have two kinds of vegetables at dinner.  · Limit foods that are high in a substance called oxalate. These include:  ? Spinach.  ? Rhubarb.  ? Beets.  ? Potato chips and french fries.  ? Nuts.  · If you regularly take a diuretic medicine, make sure to eat at least 1-2 fruits or vegetables high in potassium each day. These include:  ? Avocado.  ? Banana.  ? Orange, prune, carrot, or tomato juice.  ? Baked potato.  ? Cabbage.  ? Beans and split peas.  General instructions    · Drink enough fluid to keep your urine clear or pale yellow. This is the most important thing you can do.  · Talk to your health care provider and dietitian about taking daily supplements. Depending on your health and the cause of your kidney stones, you may be advised:  ? Not to take supplements with vitamin C.  ? To take a calcium supplement.  ? To take a daily probiotic supplement.  ? To take other supplements such as magnesium, fish oil, or vitamin B6.  · Take all medicines and supplements as told by your health care provider.  · Limit alcohol intake to no  more than 1 drink a day for nonpregnant women and 2 drinks a day for men. One drink equals 12 oz of beer, 5 oz of wine, or 1½ oz of hard liquor.  · Lose weight if told by your health care provider. Work with your dietitian to find strategies and an eating plan that works best for you.  What foods are not recommended?  Limit your intake of the following foods, or as told by your dietitian. Talk to your dietitian about specific foods you should avoid based on the type of kidney stones and your overall health.  Grains  Breads. Bagels. Rolls. Baked goods. Salted crackers. Cereal. Pasta.  Vegetables  Spinach. Rhubarb. Beets. Canned vegetables. Pickles. Olives.  Meats and other protein foods  Nuts. Nut butters. Large portions of meat, poultry, or fish. Salted or cured meats. Deli meats. Hot dogs. Sausages.  Dairy  Cheese.  Beverages  Regular soft drinks. Regular vegetable juice.  Seasonings and other foods  Seasoning blends with salt. Salad dressings. Canned soups. Soy sauce. Ketchup. Barbecue sauce. Canned pasta sauce. Casseroles. Pizza. Lasagna. Frozen meals. Potato chips. French fries.  Summary  · You can reduce your risk of kidney stones by making changes to your diet.  · The most important thing you can do is drink enough fluid. You should drink enough fluid to keep your urine clear or pale yellow.  · Ask your health care provider or dietitian how much protein from animal sources you should eat each day, and also how much salt and calcium you should have each day.  This information is not intended to replace advice given to you by your health care provider. Make sure you discuss any questions you have with your health care provider.  Document Released: 04/13/2012 Document Revised: 11/28/2017 Document Reviewed: 11/28/2017  [employer] Patient Education © 2020 [employer]

## 2020-02-28 ENCOUNTER — PROCEDURE VISIT (OUTPATIENT)
Dept: UROLOGY | Facility: CLINIC | Age: 40
End: 2020-02-28

## 2020-02-28 VITALS — HEIGHT: 68 IN | WEIGHT: 232 LBS | BODY MASS INDEX: 35.16 KG/M2

## 2020-02-28 DIAGNOSIS — N20.1 LEFT URETERAL STONE: Primary | ICD-10-CM

## 2020-02-28 PROCEDURE — 99213 OFFICE O/P EST LOW 20 MIN: CPT | Performed by: UROLOGY

## 2020-02-28 RX ORDER — HYDROCODONE BITARTRATE AND ACETAMINOPHEN 5; 325 MG/1; MG/1
TABLET ORAL
COMMUNITY
Start: 2020-02-20

## 2020-02-28 RX ORDER — ONDANSETRON 4 MG/1
TABLET, FILM COATED ORAL
COMMUNITY
Start: 2020-02-19

## 2021-02-08 ENCOUNTER — HOSPITAL ENCOUNTER (OUTPATIENT)
Dept: GENERAL RADIOLOGY | Facility: HOSPITAL | Age: 41
Discharge: HOME | End: 2021-02-08
Admitting: NURSE PRACTITIONER

## 2021-02-08 ENCOUNTER — TRANSCRIBE ORDERS (OUTPATIENT)
Dept: ADMINISTRATIVE | Facility: HOSPITAL | Age: 41
End: 2021-02-08

## 2021-02-08 DIAGNOSIS — R31.9 HEMATURIA, UNSPECIFIED TYPE: Primary | ICD-10-CM

## 2021-02-08 DIAGNOSIS — R31.9 HEMATURIA, UNSPECIFIED TYPE: ICD-10-CM

## 2021-02-08 DIAGNOSIS — M54.50 LOW BACK PAIN, UNSPECIFIED BACK PAIN LATERALITY, UNSPECIFIED CHRONICITY, UNSPECIFIED WHETHER SCIATICA PRESENT: ICD-10-CM

## 2021-02-08 PROCEDURE — 74018 RADEX ABDOMEN 1 VIEW: CPT | Performed by: RADIOLOGY

## 2021-02-08 PROCEDURE — 74018 RADEX ABDOMEN 1 VIEW: CPT

## 2021-03-23 ENCOUNTER — BULK ORDERING (OUTPATIENT)
Dept: CASE MANAGEMENT | Facility: OTHER | Age: 41
End: 2021-03-23

## 2021-03-23 DIAGNOSIS — Z23 IMMUNIZATION DUE: ICD-10-CM

## 2025-03-15 ENCOUNTER — APPOINTMENT (OUTPATIENT)
Dept: CT IMAGING | Facility: HOSPITAL | Age: 45
End: 2025-03-15
Payer: COMMERCIAL

## 2025-03-15 ENCOUNTER — HOSPITAL ENCOUNTER (EMERGENCY)
Facility: HOSPITAL | Age: 45
Discharge: HOME | End: 2025-03-15
Attending: EMERGENCY MEDICINE
Payer: COMMERCIAL

## 2025-03-15 VITALS
HEIGHT: 68 IN | SYSTOLIC BLOOD PRESSURE: 111 MMHG | BODY MASS INDEX: 36.37 KG/M2 | TEMPERATURE: 97.5 F | DIASTOLIC BLOOD PRESSURE: 58 MMHG | HEART RATE: 65 BPM | WEIGHT: 240 LBS | OXYGEN SATURATION: 90 % | RESPIRATION RATE: 20 BRPM

## 2025-03-15 DIAGNOSIS — N20.0 RENAL CALCULUS: Primary | ICD-10-CM

## 2025-03-15 LAB
ALBUMIN SERPL-MCNC: 4.1 G/DL (ref 3.5–5.2)
ALBUMIN/GLOB SERPL: 1.6 G/DL
ALP SERPL-CCNC: 120 U/L (ref 39–117)
ALT SERPL W P-5'-P-CCNC: 28 U/L (ref 1–41)
ANION GAP SERPL CALCULATED.3IONS-SCNC: 11.9 MMOL/L (ref 5–15)
AST SERPL-CCNC: 22 U/L (ref 1–40)
BACTERIA UR QL AUTO: ABNORMAL /HPF
BASOPHILS # BLD AUTO: 0.05 10*3/MM3 (ref 0–0.2)
BASOPHILS NFR BLD AUTO: 0.5 % (ref 0–1.5)
BILIRUB SERPL-MCNC: 0.6 MG/DL (ref 0–1.2)
BILIRUB UR QL STRIP: NEGATIVE
BUN SERPL-MCNC: 19 MG/DL (ref 6–20)
BUN/CREAT SERPL: 13.4 (ref 7–25)
CALCIUM SPEC-SCNC: 8.6 MG/DL (ref 8.6–10.5)
CHLORIDE SERPL-SCNC: 102 MMOL/L (ref 98–107)
CLARITY UR: CLEAR
CO2 SERPL-SCNC: 23.1 MMOL/L (ref 22–29)
COLOR UR: YELLOW
CREAT SERPL-MCNC: 1.42 MG/DL (ref 0.76–1.27)
DEPRECATED RDW RBC AUTO: 40 FL (ref 37–54)
EGFRCR SERPLBLD CKD-EPI 2021: 61.7 ML/MIN/1.73
EOSINOPHIL # BLD AUTO: 0.06 10*3/MM3 (ref 0–0.4)
EOSINOPHIL NFR BLD AUTO: 0.5 % (ref 0.3–6.2)
ERYTHROCYTE [DISTWIDTH] IN BLOOD BY AUTOMATED COUNT: 14.3 % (ref 12.3–15.4)
GLOBULIN UR ELPH-MCNC: 2.6 GM/DL
GLUCOSE SERPL-MCNC: 121 MG/DL (ref 65–99)
GLUCOSE UR STRIP-MCNC: NEGATIVE MG/DL
HCT VFR BLD AUTO: 42 % (ref 37.5–51)
HGB BLD-MCNC: 13.6 G/DL (ref 13–17.7)
HGB UR QL STRIP.AUTO: ABNORMAL
HOLD SPECIMEN: NORMAL
HOLD SPECIMEN: NORMAL
HYALINE CASTS UR QL AUTO: ABNORMAL /LPF
IMM GRANULOCYTES # BLD AUTO: 0.04 10*3/MM3 (ref 0–0.05)
IMM GRANULOCYTES NFR BLD AUTO: 0.4 % (ref 0–0.5)
KETONES UR QL STRIP: NEGATIVE
LEUKOCYTE ESTERASE UR QL STRIP.AUTO: NEGATIVE
LYMPHOCYTES # BLD AUTO: 1.92 10*3/MM3 (ref 0.7–3.1)
LYMPHOCYTES NFR BLD AUTO: 17.4 % (ref 19.6–45.3)
MCH RBC QN AUTO: 25.4 PG (ref 26.6–33)
MCHC RBC AUTO-ENTMCNC: 32.4 G/DL (ref 31.5–35.7)
MCV RBC AUTO: 78.5 FL (ref 79–97)
MONOCYTES # BLD AUTO: 0.74 10*3/MM3 (ref 0.1–0.9)
MONOCYTES NFR BLD AUTO: 6.7 % (ref 5–12)
NEUTROPHILS NFR BLD AUTO: 74.5 % (ref 42.7–76)
NEUTROPHILS NFR BLD AUTO: 8.2 10*3/MM3 (ref 1.7–7)
NITRITE UR QL STRIP: NEGATIVE
NRBC BLD AUTO-RTO: 0 /100 WBC (ref 0–0.2)
PH UR STRIP.AUTO: 5.5 [PH] (ref 5–8)
PLATELET # BLD AUTO: 298 10*3/MM3 (ref 140–450)
PMV BLD AUTO: 9.2 FL (ref 6–12)
POTASSIUM SERPL-SCNC: 4 MMOL/L (ref 3.5–5.2)
PROT SERPL-MCNC: 6.7 G/DL (ref 6–8.5)
PROT UR QL STRIP: ABNORMAL
RBC # BLD AUTO: 5.35 10*6/MM3 (ref 4.14–5.8)
RBC # UR STRIP: ABNORMAL /HPF
REF LAB TEST METHOD: ABNORMAL
SODIUM SERPL-SCNC: 137 MMOL/L (ref 136–145)
SP GR UR STRIP: 1.01 (ref 1–1.03)
SQUAMOUS #/AREA URNS HPF: ABNORMAL /HPF
UROBILINOGEN UR QL STRIP: ABNORMAL
WBC # UR STRIP: ABNORMAL /HPF
WBC NRBC COR # BLD AUTO: 11.01 10*3/MM3 (ref 3.4–10.8)
WHOLE BLOOD HOLD COAG: NORMAL
WHOLE BLOOD HOLD SPECIMEN: NORMAL

## 2025-03-15 PROCEDURE — 96375 TX/PRO/DX INJ NEW DRUG ADDON: CPT

## 2025-03-15 PROCEDURE — 80053 COMPREHEN METABOLIC PANEL: CPT

## 2025-03-15 PROCEDURE — 85025 COMPLETE CBC W/AUTO DIFF WBC: CPT

## 2025-03-15 PROCEDURE — 25810000003 SODIUM CHLORIDE 0.9 % SOLUTION

## 2025-03-15 PROCEDURE — 74176 CT ABD & PELVIS W/O CONTRAST: CPT

## 2025-03-15 PROCEDURE — 36415 COLL VENOUS BLD VENIPUNCTURE: CPT

## 2025-03-15 PROCEDURE — 25010000002 KETOROLAC TROMETHAMINE PER 15 MG

## 2025-03-15 PROCEDURE — 25010000002 ONDANSETRON PER 1 MG

## 2025-03-15 PROCEDURE — 96374 THER/PROPH/DIAG INJ IV PUSH: CPT

## 2025-03-15 PROCEDURE — 25010000002 HYDROMORPHONE 1 MG/ML SOLUTION

## 2025-03-15 PROCEDURE — 99284 EMERGENCY DEPT VISIT MOD MDM: CPT

## 2025-03-15 PROCEDURE — 81001 URINALYSIS AUTO W/SCOPE: CPT

## 2025-03-15 RX ORDER — ONDANSETRON 2 MG/ML
4 INJECTION INTRAMUSCULAR; INTRAVENOUS ONCE
Status: COMPLETED | OUTPATIENT
Start: 2025-03-15 | End: 2025-03-15

## 2025-03-15 RX ORDER — TAMSULOSIN HYDROCHLORIDE 0.4 MG/1
1 CAPSULE ORAL DAILY
Qty: 30 CAPSULE | Refills: 0 | Status: SHIPPED | OUTPATIENT
Start: 2025-03-15

## 2025-03-15 RX ORDER — SODIUM CHLORIDE 0.9 % (FLUSH) 0.9 %
10 SYRINGE (ML) INJECTION AS NEEDED
Status: DISCONTINUED | OUTPATIENT
Start: 2025-03-15 | End: 2025-03-15 | Stop reason: HOSPADM

## 2025-03-15 RX ORDER — HYDROCODONE BITARTRATE AND ACETAMINOPHEN 7.5; 325 MG/1; MG/1
1 TABLET ORAL EVERY 6 HOURS PRN
Qty: 12 TABLET | Refills: 0 | Status: SHIPPED | OUTPATIENT
Start: 2025-03-15

## 2025-03-15 RX ORDER — CEFDINIR 300 MG/1
300 CAPSULE ORAL 2 TIMES DAILY
Qty: 20 CAPSULE | Refills: 0 | Status: SHIPPED | OUTPATIENT
Start: 2025-03-15

## 2025-03-15 RX ORDER — KETOROLAC TROMETHAMINE 30 MG/ML
15 INJECTION, SOLUTION INTRAMUSCULAR; INTRAVENOUS ONCE
Status: COMPLETED | OUTPATIENT
Start: 2025-03-15 | End: 2025-03-15

## 2025-03-15 RX ADMIN — KETOROLAC TROMETHAMINE 15 MG: 30 INJECTION, SOLUTION INTRAMUSCULAR; INTRAVENOUS at 13:37

## 2025-03-15 RX ADMIN — HYDROMORPHONE HYDROCHLORIDE 1 MG: 1 INJECTION, SOLUTION INTRAMUSCULAR; INTRAVENOUS; SUBCUTANEOUS at 13:02

## 2025-03-15 RX ADMIN — ONDANSETRON 4 MG: 2 INJECTION INTRAMUSCULAR; INTRAVENOUS at 12:59

## 2025-03-15 RX ADMIN — SODIUM CHLORIDE 1000 ML: 9 INJECTION, SOLUTION INTRAVENOUS at 12:59

## 2025-03-15 NOTE — ED PROVIDER NOTES
Subjective   History of Present Illness  Dakotah is a 44-year-old male who presents for evaluation for flank pain.  He reports that he began having severe flank pain with radiation to his anterior abdomen around 7 AM.  Complains of right sided abdominal and flank pain.  States that he is only able to dribble several drops of blood at a time.  He is unable to urinate.  Reports vomiting.  He states that he had an impacted stone that had to be surgically removed in 2020.  Also endorses cold sweats.  He had 10 mg of Toradol this morning as well as Flomax, Zofran and Cipro.  However, patient has vomited since.  Denies any other pertinent past medical history.      Review of Systems   Gastrointestinal:  Positive for abdominal pain and nausea.   Genitourinary:  Positive for dysuria, flank pain and hematuria.       Past Medical History:   Diagnosis Date    Kidney stones        Allergies   Allergen Reactions    Penicillins Rash       Past Surgical History:   Procedure Laterality Date    TONSILLECTOMY      URETEROSCOPY LASER LITHOTRIPSY WITH STENT INSERTION Left 2/26/2020    Procedure: URETEROSCOPY LASER LITHOTRIPSY;  Surgeon: Orestes Ibrahim MD;  Location: [facility];  Service: Urology;  Laterality: Left;       Family History   Problem Relation Age of Onset    No Known Problems Father     No Known Problems Mother        Social History     Socioeconomic History    Marital status:    Tobacco Use    Smoking status: Never    Smokeless tobacco: Never   Substance and Sexual Activity    Alcohol use: Not Currently    Drug use: Never    Sexual activity: Yes     Partners: Female           Objective   Physical Exam    Procedures           ED Course  ED Course as of 03/15/25 1701   Sat Mar 15, 2025   1326 WBC(!): 11.01 [CE]   1338 eGFR: 61.7 [CE]   1513 Awaiting CT report. [CE]   1601 Narrative & Impression  PROCEDURE: CT of the abdomen and pelvis performed without IV contrast on  March 15, 2025. The examination was  performed with 4 mm axial imaging  and 4 mm sagittal coronal reconstruction images. The examination was  performed according to as low as reasonably achievable dose protocol.     HISTORY: Right flank pain. History of renal stones.     COMPARISON: None.     FINDINGS:     Small bands of scar versus atelectasis at the lung bases.  No pleural effusion or lung base pneumothorax  Bilateral nephrolithiasis with 1 mm calcifications in the right and left  kidney.  Mild fullness to the right renal collecting system due to a 1 mm stone  in the proximal right ureter seen best on image 72, series 2.  Partial right renal obstruction.  Mild stranding around the right kidney.  No abdominal aortic aneurysm or retroperitoneal hemorrhage.  No acute process seen in the liver, spleen, gallbladder, pancreas, and  adrenal glands.  Small umbilical hernia contains only fat.  Slight prominence to the size of the prostate gland.  Mild sigmoid colon diverticulosis.  Small mesenteric nodes.  Slight chronic grade 1 anterolisthesis of L5 on S1.     IMPRESSION:     There is a 1 mm stone at the proximal right ureter with associated  partial right renal obstruction.  Mild stranding around the right kidney and mild fullness to the proximal  right renal collecting system consistent with partial right renal  obstruction related changes.  Bilateral nephrolithiasis  Mild sigmoid colon diverticulosis  Mild chronic grade 1 anterolisthesis of L5 on S1.  The appendix is not clearly identified.  No features to suggest acute appendicitis.  No free fluid or free air.  No abscess or hematoma.  Small bands of scar versus atelectasis at the lung bases.     This report was finalized on 3/15/2025 4:01 PM by Butch Che MD.   [CE]      ED Course User Index  [CE] Theron Hernandez APRN                                                       Medical Decision Making  44-year-old male who presents for evaluation for flank pain, nausea vomiting and history of kidney  stones.  Complaining of right flank pain.    Problems Addressed:  Renal calculus: complicated acute illness or injury    Amount and/or Complexity of Data Reviewed  Labs: ordered. Decision-making details documented in ED Course.  Radiology: ordered.    Risk  Prescription drug management.  Risk Details: ED stay uneventful.  Imaging reveals some slight stranding around right kidney as well as 1 mm stone.  Patient is able to urinate.  He will be discharged with instructions to follow-up with urology.  He will follow-up with primary care.  Prophylactically placed on Omnicef.  Prescribed Flomax and Norco.  Will return to the emergency department if any new needs, concerns or changes arise.  Will return if inability to urinate.        Final diagnoses:   Renal calculus       ED Disposition  ED Disposition       ED Disposition   Discharge    Condition   Stable    Comment   --               Gloria Ritchie  [address]  [place] [address]  [phone]    In 3 days      [facility] EMERGENC[facility]  [address] [id number] [phone]    If symptoms worsen    Orestes Ibrahim MD  [address] [facility]  [address]  [place] [id number] [phone]    In 3 days           Medication List        New Prescriptions      cefdinir 300 MG capsule  Commonly known as: OMNICEF  Take 1 capsule by mouth 2 (Two) Times a Day.     HYDROcodone-acetaminophen 7.5-325 MG per tablet  Commonly known as: NORCO  Take 1 tablet by mouth Every 6 (Six) Hours As Needed for Moderate Pain.  Replaces: HYDROcodone-acetaminophen 5-325 MG per tablet            Stop      ciprofloxacin 500 MG tablet  Commonly known as: CIPRO     HYDROcodone-acetaminophen 5-325 MG per tablet  Commonly known as: NORCO  Replaced by: HYDROcodone-acetaminophen 7.5-325 MG per tablet               Where to Get Your Medications        These medications were sent to [facility] [id number] - [address] - [phone]  -  [phone]   [address] [id number]      Phone: [phone]   cefdinir 300 MG capsule  HYDROcodone-acetaminophen 7.5-325 MG per tablet  tamsulosin 0.4 MG capsule 24 hr capsule            Theron Hernandez, APRN  03/15/25 0366

## (undated) DEVICE — DRAINBAG,ANTI-REFLUX TOWER,L/F,2000ML,LL: Brand: MEDLINE

## (undated) DEVICE — FIBR LASR HOLMIUM SLIMLINE SIS EZ 365U

## (undated) DEVICE — CATHETER,FOLEY,100%SILICONE,20FR,10ML,LF: Brand: MEDLINE

## (undated) DEVICE — COR CYSTO: Brand: MEDLINE INDUSTRIES, INC.

## (undated) DEVICE — GLW STD STR 3CM .035X150CM

## (undated) DEVICE — NITINOL STONE RETRIEVAL BASKET: Brand: ZERO TIP